# Patient Record
Sex: MALE | Race: WHITE | Employment: UNEMPLOYED | URBAN - METROPOLITAN AREA
[De-identification: names, ages, dates, MRNs, and addresses within clinical notes are randomized per-mention and may not be internally consistent; named-entity substitution may affect disease eponyms.]

---

## 2020-03-30 ENCOUNTER — TELEPHONE (OUTPATIENT)
Dept: GASTROENTEROLOGY | Age: 40
End: 2020-03-30

## 2020-04-20 ENCOUNTER — VIRTUAL VISIT (OUTPATIENT)
Dept: GASTROENTEROLOGY | Age: 40
End: 2020-04-20
Payer: MEDICARE

## 2020-04-20 PROCEDURE — 99214 OFFICE O/P EST MOD 30 MIN: CPT | Performed by: INTERNAL MEDICINE

## 2020-04-20 RX ORDER — OMEPRAZOLE 20 MG/1
CAPSULE, DELAYED RELEASE ORAL
COMMUNITY
Start: 2020-03-23

## 2020-04-20 RX ORDER — NALTREXONE 380 MG
KIT INTRAMUSCULAR
COMMUNITY
Start: 2020-03-19

## 2020-04-20 RX ORDER — METHOCARBAMOL 750 MG/1
TABLET, FILM COATED ORAL
COMMUNITY
Start: 2020-03-23

## 2020-04-20 RX ORDER — QUETIAPINE FUMARATE 300 MG/1
TABLET, FILM COATED ORAL
COMMUNITY
Start: 2020-03-23

## 2020-04-20 NOTE — PROGRESS NOTES
2020    TELEHEALTH EVALUATION -- Audio/Visual (During JLVEI-03 public health emergency)    Due to [place] Shark 19 outbreak, patient's office visit was converted to a virtual visit. Patient was contacted and agreed to proceed with a virtual visit via Telephone Visit  The risks and benefits of converting to a virtual visit were discussed in light of the current infectious disease epidemic. Patient also understood that insurance coverage and co-pays are up to their individual insurance plans. Chief Complaint   Patient presents with    Consultation    Hepatic Disease     dx with hep c. Sober for 8-9 months      HPI:    Reynaldo Stern (:  1980) has requested an audio/video evaluation for the following concern(s):  Patient has a known Hx of hepatitis C diagnosed 1 year back. ever had prior treatment ([facility]) . 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 Last use was 9 months back currently on Vivitrol. He does report tattoos that were professionally done. No drug or alcohol use at this time. Denies any    blood per rectum, hx of hematemesis or melena. No Hx of juandice, easy bruising,  confusion or memory issues. No increased of the abdominal girth or lower extremity edema noticed. No recent liver related hospital stay. . Patient come in for the initial visi      Previous GI work up/Endoscopic investigations: None    Review of Systems    Prior to Visit Medications    Medication Sig Taking?  Authorizing Provider   methocarbamol (ROBAXIN) 750 MG tablet TK 1 T PO TID PRF MSP Yes Historical Provider, MD   omeprazole (PRILOSEC) 20 MG delayed release capsule TK 1 C PO QD Yes Historical Provider, MD   QUEtiapine (SEROQUEL) 300 MG tablet TK 1/2 T PO QD Yes Historical Provider, MD   VIVITROL 380 MG injection Inject intramuscularly Every month Yes Historical Provider, MD     Social History     Tobacco Use    Smoking status: Current Every Day Smoker    Smokeless tobacco: Never

## 2020-05-27 ENCOUNTER — ANCILLARY PROCEDURE (OUTPATIENT)
Dept: ENDOSCOPY | Age: 40
End: 2020-05-27
Payer: MEDICARE

## 2020-05-27 PROCEDURE — 91200 LIVER ELASTOGRAPHY: CPT

## 2020-05-31 ENCOUNTER — HOSPITAL ENCOUNTER (OUTPATIENT)
Dept: ULTRASOUND IMAGING | Age: 40
Discharge: HOME | End: 2020-06-02
Payer: MEDICARE

## 2020-05-31 PROCEDURE — 76705 ECHO EXAM OF ABDOMEN: CPT

## 2021-12-11 LAB
HIV AG/AB: NONREACTIVE
SPECIMEN SOURCE: NORMAL

## 2022-05-21 LAB
HIV AG/AB: NONREACTIVE
SPECIMEN SOURCE: NORMAL

## 2023-05-24 ENCOUNTER — TELEPHONE (OUTPATIENT)
Dept: GASTROENTEROLOGY | Age: 43
End: 2023-05-24

## 2023-05-24 NOTE — TELEPHONE ENCOUNTER
Pt referred in March for colonoscopy. Has not been contacted to set up appointment. I called him to schedule but he didn't answer. Please call again to schedule.

## 2024-10-05 ENCOUNTER — HOSPITAL ENCOUNTER (EMERGENCY)
Facility: HOSPITAL | Age: 44
Discharge: HOME | End: 2024-10-05
Payer: MEDICAID

## 2024-10-05 VITALS
RESPIRATION RATE: 18 BRPM | OXYGEN SATURATION: 97 % | BODY MASS INDEX: 22.12 KG/M2 | DIASTOLIC BLOOD PRESSURE: 76 MMHG | TEMPERATURE: 97.7 F | HEART RATE: 94 BPM | WEIGHT: 158 LBS | SYSTOLIC BLOOD PRESSURE: 132 MMHG | HEIGHT: 71 IN
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
## 2024-10-05 PROCEDURE — 2500000004 HC RX 250 GENERAL PHARMACY W/ HCPCS (ALT 636 FOR OP/ED): Performed by: NURSE PRACTITIONER

## 2024-10-05 PROCEDURE — 2500000001 HC RX 250 WO HCPCS SELF ADMINISTERED DRUGS (ALT 637 FOR MEDICARE OP): Performed by: NURSE PRACTITIONER

## 2024-10-05 PROCEDURE — 96372 THER/PROPH/DIAG INJ SC/IM: CPT | Performed by: NURSE PRACTITIONER

## 2024-10-05 PROCEDURE — 99283 EMERGENCY DEPT VISIT LOW MDM: CPT | Mod: 25

## 2024-10-05 RX ORDER — KETOROLAC TROMETHAMINE 30 MG/ML
15 INJECTION, SOLUTION INTRAMUSCULAR; INTRAVENOUS ONCE
Status: COMPLETED | OUTPATIENT
Start: 2024-10-05 | End: 2024-10-05

## 2024-10-05 RX ORDER — BACITRACIN ZINC 500 UNIT/G
OINTMENT IN PACKET (EA) TOPICAL ONCE
Status: COMPLETED | OUTPATIENT
Start: 2024-10-05 | End: 2024-10-05

## 2024-10-05 RX ORDER — IBUPROFEN 600 MG/1
600 TABLET ORAL EVERY 6 HOURS PRN
Qty: 24 TABLET | Refills: 0 | Status: SHIPPED | OUTPATIENT
Start: 2024-10-05

## 2024-10-05 RX ORDER — ACETAMINOPHEN 325 MG/1
975 TABLET ORAL ONCE
Status: COMPLETED | OUTPATIENT
Start: 2024-10-05 | End: 2024-10-05

## 2024-10-05 RX ORDER — AMOXICILLIN AND CLAVULANATE POTASSIUM 875; 125 MG/1; MG/1
875 TABLET, FILM COATED ORAL 2 TIMES DAILY
Qty: 14 TABLET | Refills: 0 | Status: SHIPPED | OUTPATIENT
Start: 2024-10-05 | End: 2024-10-12

## 2024-10-05 RX ADMIN — KETOROLAC TROMETHAMINE 15 MG: 30 INJECTION, SOLUTION INTRAMUSCULAR at 06:43

## 2024-10-05 RX ADMIN — ACETAMINOPHEN 975 MG: 325 TABLET ORAL at 06:43

## 2024-10-05 RX ADMIN — BACITRACIN 1 APPLICATION: 500 OINTMENT TOPICAL at 06:43

## 2024-10-05 ASSESSMENT — PAIN DESCRIPTION - LOCATION: LOCATION: HAND

## 2024-10-05 ASSESSMENT — PAIN DESCRIPTION - ORIENTATION: ORIENTATION: LEFT

## 2024-10-05 ASSESSMENT — PAIN - FUNCTIONAL ASSESSMENT: PAIN_FUNCTIONAL_ASSESSMENT: 0-10

## 2024-10-05 ASSESSMENT — PAIN SCALES - GENERAL: PAINLEVEL_OUTOF10: 8

## 2024-10-05 ASSESSMENT — PAIN DESCRIPTION - FREQUENCY: FREQUENCY: CONSTANT/CONTINUOUS

## 2024-10-05 ASSESSMENT — COLUMBIA-SUICIDE SEVERITY RATING SCALE - C-SSRS
6. HAVE YOU EVER DONE ANYTHING, STARTED TO DO ANYTHING, OR PREPARED TO DO ANYTHING TO END YOUR LIFE?: NO
2. HAVE YOU ACTUALLY HAD ANY THOUGHTS OF KILLING YOURSELF?: NO
1. IN THE PAST MONTH, HAVE YOU WISHED YOU WERE DEAD OR WISHED YOU COULD GO TO SLEEP AND NOT WAKE UP?: NO

## 2024-10-05 ASSESSMENT — PAIN DESCRIPTION - DESCRIPTORS: DESCRIPTORS: THROBBING

## 2024-10-05 NOTE — ED PROVIDER NOTES
HPI   Chief Complaint   Patient presents with    Animal Bite       43-year-old male presents emergency department, states he was walking down the street, there was a couple walking a dog, dog just reached out and bit the patient in his left forearm/wrist.  States started to swell and had pain associated.  States 1 bite wound.      History provided by:  Patient   used: No            Patient History   Past Medical History:   Diagnosis Date    Personal history of other mental and behavioral disorders     History of attention deficit hyperactivity disorder (ADHD)     Past Surgical History:   Procedure Laterality Date    MOUTH SURGERY  05/15/2017    Oral Surgery Tooth Extraction    OTHER SURGICAL HISTORY  05/15/2017    Oral Surgery Tooth Extraction [place] Tooth    TONSILLECTOMY  05/15/2017    Tonsillectomy     No family history on file.  Social History     Tobacco Use    Smoking status: Not on file    Smokeless tobacco: Not on file   Substance Use Topics    Alcohol use: Not on file    Drug use: Not on file       Physical Exam   ED Triage Vitals [10/05/24 0532]   Temperature Heart Rate Respirations BP   36.5 °C (97.7 °F) (!) 110 20 136/74      Pulse Ox Temp Source Heart Rate Source Patient Position   96 % Tympanic Monitor Sitting      BP Location FiO2 (%)     Right arm --       Physical Exam  Physical Exam:  Constitutional: Vitals noted, no distress. Afebrile.   Cardiovascular: Regular, rate, rhythm, no murmur.   Pulmonary: Lungs clear bilaterally with good aeration. No adventitious breath sounds.   Musculoskeletal: Left wrist with 1 puncture wound, stellate.  There is moderate surrounding swelling associated  Skin: No rash.   Neuro: No focal neurologic deficits, NIH score of 0.      ED Course & MDM   Diagnoses as of 10/05/24 0631   Dog bite, initial encounter                 No data recorded     [place] Coma Scale Score: 15 (10/05/24 0534 : Nina Jensen RN)                     Medical Decision  Making  Patient complains of pain associated with dog bite to the left wrist/forearm area.    The wound was thoroughly cleansed with chlorhexidine, irrigated with a liter of saline.    Dressed with bacitracin and dry sterile dressing by the bedside nurse.  Given Toradol and acetaminophen as the patient's on Antabuse medications.    Will be discharged home on 7-day course of Augmentin, continue with ibuprofen, continue with ice and elevation.  Did recommend close follow-up with primary care, return with any worsening symptoms or additional concerns.    Procedure  Procedures     CA Castelan-SYDNI  10/05/24 0636

## 2025-01-21 ENCOUNTER — HOSPITAL ENCOUNTER (EMERGENCY)
Facility: HOSPITAL | Age: 45
Discharge: HOME | End: 2025-01-21
Payer: MEDICAID

## 2025-01-21 VITALS
DIASTOLIC BLOOD PRESSURE: 74 MMHG | BODY MASS INDEX: 22.26 KG/M2 | TEMPERATURE: 96.8 F | RESPIRATION RATE: 18 BRPM | WEIGHT: 159 LBS | HEART RATE: 89 BPM | HEIGHT: 71 IN | OXYGEN SATURATION: 96 % | SYSTOLIC BLOOD PRESSURE: 134 MMHG

## 2025-01-21 DIAGNOSIS — Z02.83 ENCOUNTER FOR DRUG SCREENING: Primary | ICD-10-CM

## 2025-01-21 LAB
AMPHETAMINES UR QL SCN: ABNORMAL
BARBITURATES UR QL SCN: ABNORMAL
BENZODIAZ UR QL SCN: ABNORMAL
BZE UR QL SCN: ABNORMAL
CANNABINOIDS UR QL SCN: ABNORMAL
FENTANYL+NORFENTANYL UR QL SCN: ABNORMAL
METHADONE UR QL SCN: ABNORMAL
OPIATES UR QL SCN: ABNORMAL
OXYCODONE+OXYMORPHONE UR QL SCN: ABNORMAL
PCP UR QL SCN: ABNORMAL

## 2025-01-21 PROCEDURE — 99283 EMERGENCY DEPT VISIT LOW MDM: CPT

## 2025-01-21 PROCEDURE — 80307 DRUG TEST PRSMV CHEM ANLYZR: CPT

## 2025-01-21 ASSESSMENT — PAIN SCALES - GENERAL: PAINLEVEL_OUTOF10: 0 - NO PAIN

## 2025-01-21 ASSESSMENT — PAIN - FUNCTIONAL ASSESSMENT: PAIN_FUNCTIONAL_ASSESSMENT: 0-10

## 2025-01-22 NOTE — ED PROVIDER NOTES
HPI   Chief Complaint   Patient presents with   • Drug / Alcohol Assessment     Pt wants drug test       44-year-old male presents emergency department requesting a urine drug test.  Patient states he is not experiencing any symptoms, but would like to receive a urine drug test to prove to his partner that he is telling her the truth.  Patient states he smokes marijuana daily, as well as uses methamphetamine on a regular basis.  Patient denies CP, SOB, abdominal pain, back or flank pain, calf pain or swelling, fever, chills, body aches.        Patient History   Past Medical History:   Diagnosis Date   • Personal history of other mental and behavioral disorders     History of attention deficit hyperactivity disorder (ADHD)     Past Surgical History:   Procedure Laterality Date   • MOUTH SURGERY  05/15/2017    Oral Surgery Tooth Extraction   • OTHER SURGICAL HISTORY  05/15/2017    Oral Surgery Tooth Extraction [place] Tooth   • TONSILLECTOMY  05/15/2017    Tonsillectomy     No family history on file.  Social History     Tobacco Use   • Smoking status: Not on file   • Smokeless tobacco: Not on file   Vaping Use   • Vaping status: Never Used   Substance Use Topics   • Alcohol use: Never   • Drug use: Yes     Types: Marijuana       Physical Exam   ED Triage Vitals [01/21/25 1802]   Temperature Heart Rate Respirations BP   36 °C (96.8 °F) 89 18 134/74      Pulse Ox Temp Source Heart Rate Source Patient Position   96 % Temporal Monitor Sitting      BP Location FiO2 (%)     Right arm --       Physical Exam  Vitals and nursing note reviewed.   Constitutional:       General: He is not in acute distress.     Appearance: He is well-developed. He is not ill-appearing, toxic-appearing or diaphoretic.   HENT:      Head: Normocephalic and atraumatic.   Eyes:      Conjunctiva/sclera: Conjunctivae normal.   Cardiovascular:      Rate and Rhythm: Normal rate and regular rhythm.      Pulses: Normal pulses.      Heart sounds: Normal heart  sounds. No murmur heard.     No friction rub. No gallop.   Pulmonary:      Effort: Pulmonary effort is normal. No respiratory distress.      Breath sounds: Normal breath sounds.   Abdominal:      General: Abdomen is flat. Bowel sounds are normal.      Palpations: Abdomen is soft.      Tenderness: There is no abdominal tenderness. There is no right CVA tenderness or left CVA tenderness.   Musculoskeletal:         General: No swelling.      Cervical back: Neck supple.      Right lower leg: No edema.      Left lower leg: No edema.   Skin:     General: Skin is warm and dry.      Capillary Refill: Capillary refill takes less than 2 seconds.      Findings: No bruising, erythema or rash.   Neurological:      General: No focal deficit present.      Mental Status: He is alert and oriented to person, place, and time. Mental status is at baseline.   Psychiatric:         Mood and Affect: Mood normal.         Behavior: Behavior is cooperative.       ED Course & MDM   Diagnoses as of 01/21/25 1913   Encounter for drug screening     Labs Reviewed   DRUG SCREEN,URINE        No orders to display         No data recorded     [place] Coma Scale Score: 15 (01/21/25 1804 : Hellen Gilman RN)                     Medical Decision Making  44-year-old male presents emergency department requesting a urine drug test.  Vital signs stable.    On physical exam patient is nontoxic-appearing, no acute distress.  Abdomen soft and nontender.  Heart sounds normal with regular rate and rhythm.  Lungs clear to auscultation bilaterally.  Negative Homans' sign bilaterally.    Patient does not currently have any complaints, states he came simply for drug screen.  Patient was discharged prior to results, as he states he has access to his MyChart and he is able to review his charts online.  Patient was given return precautions, as well as time spent educating the patient on cessation of drug use.  Patient demonstrated verbal understanding.  All questions  and concerns were addressed and answered prior to discharge    Amount and/or Complexity of Data Reviewed  External Data Reviewed: notes.  Labs: ordered. Decision-making details documented in ED Course.           Noble Mack PA-C  01/21/25 1916